# Patient Record
Sex: FEMALE | Race: WHITE | NOT HISPANIC OR LATINO | Employment: FULL TIME | URBAN - METROPOLITAN AREA
[De-identification: names, ages, dates, MRNs, and addresses within clinical notes are randomized per-mention and may not be internally consistent; named-entity substitution may affect disease eponyms.]

---

## 2022-09-23 ENCOUNTER — APPOINTMENT (OUTPATIENT)
Dept: MRI IMAGING | Facility: CLINIC | Age: 42
End: 2022-09-23
Attending: EMERGENCY MEDICINE
Payer: COMMERCIAL

## 2022-09-23 ENCOUNTER — TELEPHONE (OUTPATIENT)
Dept: OPHTHALMOLOGY | Facility: CLINIC | Age: 42
End: 2022-09-23

## 2022-09-23 ENCOUNTER — HOSPITAL ENCOUNTER (EMERGENCY)
Facility: CLINIC | Age: 42
Discharge: HOME | End: 2022-09-23
Attending: EMERGENCY MEDICINE | Admitting: EMERGENCY MEDICINE
Payer: COMMERCIAL

## 2022-09-23 VITALS
WEIGHT: 115 LBS | SYSTOLIC BLOOD PRESSURE: 100 MMHG | RESPIRATION RATE: 16 BRPM | DIASTOLIC BLOOD PRESSURE: 60 MMHG | TEMPERATURE: 98.1 F | OXYGEN SATURATION: 100 % | HEART RATE: 68 BPM

## 2022-09-23 DIAGNOSIS — H55.00 NYSTAGMUS: ICD-10-CM

## 2022-09-23 LAB
ANION GAP SERPL CALCULATED.3IONS-SCNC: 4 MMOL/L (ref 3–14)
BUN SERPL-MCNC: 12 MG/DL (ref 7–30)
CALCIUM SERPL-MCNC: 9 MG/DL (ref 8.5–10.1)
CHLORIDE BLD-SCNC: 108 MMOL/L (ref 94–109)
CO2 SERPL-SCNC: 29 MMOL/L (ref 20–32)
CREAT SERPL-MCNC: 0.66 MG/DL (ref 0.52–1.04)
GFR SERPL CREATININE-BSD FRML MDRD: >90 ML/MIN/1.73M2
GLUCOSE BLD-MCNC: 89 MG/DL (ref 70–99)
POTASSIUM BLD-SCNC: 3.9 MMOL/L (ref 3.4–5.3)
SODIUM SERPL-SCNC: 141 MMOL/L (ref 133–144)
T4 FREE SERPL-MCNC: 1.39 NG/DL (ref 0.76–1.46)
TSH SERPL DL<=0.005 MIU/L-ACNC: 4.52 MU/L (ref 0.4–4)

## 2022-09-23 PROCEDURE — 82310 ASSAY OF CALCIUM: CPT | Performed by: EMERGENCY MEDICINE

## 2022-09-23 PROCEDURE — 255N000002 HC RX 255 OP 636: Performed by: EMERGENCY MEDICINE

## 2022-09-23 PROCEDURE — 84443 ASSAY THYROID STIM HORMONE: CPT | Performed by: EMERGENCY MEDICINE

## 2022-09-23 PROCEDURE — 36415 COLL VENOUS BLD VENIPUNCTURE: CPT | Performed by: EMERGENCY MEDICINE

## 2022-09-23 PROCEDURE — 99285 EMERGENCY DEPT VISIT HI MDM: CPT | Mod: 25 | Performed by: EMERGENCY MEDICINE

## 2022-09-23 PROCEDURE — 99284 EMERGENCY DEPT VISIT MOD MDM: CPT | Performed by: EMERGENCY MEDICINE

## 2022-09-23 PROCEDURE — 84439 ASSAY OF FREE THYROXINE: CPT | Performed by: EMERGENCY MEDICINE

## 2022-09-23 PROCEDURE — A9585 GADOBUTROL INJECTION: HCPCS | Performed by: EMERGENCY MEDICINE

## 2022-09-23 PROCEDURE — 70553 MRI BRAIN STEM W/O & W/DYE: CPT

## 2022-09-23 RX ORDER — LEVOTHYROXINE SODIUM 150 UG/1
150 TABLET ORAL DAILY
COMMUNITY

## 2022-09-23 RX ORDER — GADOBUTROL 604.72 MG/ML
5.2 INJECTION INTRAVENOUS ONCE
Status: COMPLETED | OUTPATIENT
Start: 2022-09-23 | End: 2022-09-23

## 2022-09-23 RX ADMIN — GADOBUTROL 5.2 ML: 604.72 INJECTION INTRAVENOUS at 20:58

## 2022-09-23 ASSESSMENT — ACTIVITIES OF DAILY LIVING (ADL)
ADLS_ACUITY_SCORE: 35
ADLS_ACUITY_SCORE: 35
ADLS_ACUITY_SCORE: 33

## 2022-09-23 ASSESSMENT — ENCOUNTER SYMPTOMS
WEAKNESS: 0
NUMBNESS: 0
EYE PAIN: 0
FEVER: 0
HEADACHES: 0

## 2022-09-24 NOTE — TELEPHONE ENCOUNTER
Telephone Note    I was contacted by Dr. Domingo from [facility] ED regarding this patient. The following information was obtained via phone call with this provider.      Patient is a 41-year-old female who presents with rapid eye movements.     Patient's past ocular history:   Unknown past ocular history    Patient's past medical history:   Diagnosis of multiple sclerosis in 2008, per patient (no documentation in the chart per ED provider).     Outside provider's exam revealed:   -Rapid eye movements/nystagmus  -Extraocular movements were full.  -Visual acuity, pupil exam, intraocular pressure, confrontational visual fields were not obtained.    -Patient reportedly has no changes in her vision, no pain with eye movement, and no primary concern over her eyes, but family members commented that her eyes appear to be moving rapidly, and the change are normal.  Family members convince the patient to go to the emergency department.  - Neurology service was consulted and recommended brain MRI.  Brain MRI completed and it was read by the radiology service as normal.  At the time of writing this note, there is no neurology note signed.  I am unable to read what they are eye exam was, or their assessment of the patient.    I conveyed to the consulting physician that I could provide some general thoughts regarding this patient but that a formal consult and evaluation would be necessary for me to provide an active role in the patient's care.     Following communication with the provider she determined that an ophthalmology consult was not necessary and elected to have the patient follow-up with neurology.    Recommended immediate ED evaluation if there are acute worsening of vision, pain with eye movement, acute changes in color vision, new black spots missing in her vision, severe headache not relieved by over-the-counter pain medications.      Ignacio Simpson MD  Ophthalmology, PGY-2

## 2022-09-24 NOTE — ED PROVIDER NOTES
ED Provider Note  [facility]      History     Chief Complaint   Patient presents with     Eye Problem     Referred here, pt has a history of MS, onset 3 weeks ago with rapid eye movements with facial twitching.     The history is provided by the patient and medical records.     Shanika Lopez is a 41 year old female with history of hypothyroidism and MS (patient reported) presenting to the ED for evaluation of 3 weeks of rapid eye movements. Patient reports that she was being seen by her optometrist today for 3 weeks of rapid eye movements and due to her reported history of MS was advised to be seen in the ED. She reports that she cannot tell when these eye movements are occurring, but has been told by co-workers, friends, and her mom. She has no associated eye pain or vision changes, but her eyes do feel fatigued at the end of the day. She denies headaches, numbness, tingling, weakness, dizziness, fever, urinary symptoms, or other recent illness. She has had no recent medication changes. She reports being diagnosed with MS in 2008 and has not had an MRI since. She does not currently follow with Neurology and is not on immune therapy. She states that her symptoms are just monitored by her PCP.     Optometrist: Dr. Yue Coronado   [address] [facility]  [phone]   [place]    Past Medical History  Past Medical History:   Diagnosis Date     Multiple sclerosis (H)      Thyroid disease      History reviewed. No pertinent surgical history.  levothyroxine (SYNTHROID/LEVOTHROID) 150 MCG tablet      Allergies   Allergen Reactions     Penicillins Rash     Family History  No family history on file.  Social History       Past medical history, past surgical history, medications, allergies, family history, and social history were reviewed with the patient. No additional pertinent items.       Review of Systems   Constitutional: Negative for fever.   Eyes: Negative for pain  and visual disturbance.        Rapid eye movements   Neurological: Negative for weakness, numbness and headaches.   All other systems reviewed and are negative.    A complete review of systems was performed with pertinent positives and negatives noted in the HPI, and all other systems negative.    Physical Exam   BP: 101/59  Pulse: 73  Temp: 98.1  F (36.7  C)  Resp: 16  SpO2: 100 %  Physical Exam  General: patient is alert and oriented and in no acute distress   Head: atraumatic and normocephalic   EENT: moist mucus membranes without tonsillar erythema or exudates, pupils 2mm equal round and reactive, EOMI  Neck: supple    Cardiovascular: regular rate and rhythm, extremities warm and well perfused, no lower extremity edema  Pulmonary: lungs clear to auscultation bilaterally   Abdomen: soft, non-tender   Musculoskeletal: normal range of motion   Neurological: alert and oriented, moving all extremities symmetrically, CN II-XII intact, strength 5/5 and symmetric in , elbow flexion/extension, hip flexion/extension, knee flexion/extension and ankle plantar/dorsiflexion, sensation to light touch in distal upper and lower extremities intact, normal finger to nose bilaterally  Skin: warm, dry     ED Course      Procedures                     No results found for any visits on 09/23/22.  Medications - No data to display     Assessments & Plan (with Medical Decision Making)    is a 41 year old female with history of hypothyroidism and MS (patient reported) presenting to the ED for evaluation of 3 weeks of rapid eye movements.  She is hemodynamically stable, afebrile and in no respiratory distress.  I do not appreciate any focal deficits on neuro exam.  She does intermittently have episodes of rapid eye movements while speaking with her.  Her history and exam are not consistent with optic neuritis.  I did discuss with neurology and have recommended a brain MRI with and without contrast to determine if she has new  acute lesions consistent with MS. Brain MRI shows no acute lesions or evidence of mass or infarct.  I did review with neurology and have recommended ophthalmology consultation.  I did consult with ophthalmology and does not have indication for emergent evaluation.  Patient may follow-up in clinic.  Baseline labs show no significant electrolyte abnormalities.  TSH is slightly elevated at 4.52, T4 1.39.  She will also plan to follow-up with her primary care provider for further evaluation.  She was given close return precautions for the emergency department and voiced understanding.    I have reviewed the nursing notes. I have reviewed the findings, diagnosis, plan and need for follow up with the patient.    New Prescriptions    No medications on file       Final diagnoses:   Nystagmus   I, Chanell Velez, am serving as a trained medical scribe to document services personally performed by Fina Chu MD, based on the provider's statements to me.     I, Fina Chu MD, was physically present and have reviewed and verified the accuracy of this note documented by Chanell Velez.      --  Fina Chu MD  [facility] EMERGENCY DEPARTMENT  9/23/2022     Fina Chu MD  09/23/22 0354

## 2022-09-24 NOTE — DISCHARGE INSTRUCTIONS
Please make an appointment to follow up with Your Primary Care Provider and Eye Clinic (phone: [phone]) as soon as possible.  [facility] will call you to coordinate your care as prescribed by your provider. If you don't hear from a representative within 2 business days, please call [phone].    If you have any worsening symptoms including severe eye pain, vision changes, difficulty walking, severe headache or other concerns return to the emergency department for reevaluation.